# Patient Record
Sex: MALE | Race: OTHER | HISPANIC OR LATINO
[De-identification: names, ages, dates, MRNs, and addresses within clinical notes are randomized per-mention and may not be internally consistent; named-entity substitution may affect disease eponyms.]

---

## 2021-09-24 PROBLEM — Z00.00 ENCOUNTER FOR PREVENTIVE HEALTH EXAMINATION: Status: ACTIVE | Noted: 2021-09-24

## 2021-10-29 ENCOUNTER — NON-APPOINTMENT (OUTPATIENT)
Age: 51
End: 2021-10-29

## 2021-10-29 ENCOUNTER — APPOINTMENT (OUTPATIENT)
Dept: GASTROENTEROLOGY | Facility: CLINIC | Age: 51
End: 2021-10-29
Payer: COMMERCIAL

## 2021-10-29 VITALS
OXYGEN SATURATION: 98 % | HEIGHT: 66 IN | BODY MASS INDEX: 25.55 KG/M2 | DIASTOLIC BLOOD PRESSURE: 60 MMHG | HEART RATE: 45 BPM | WEIGHT: 159 LBS | TEMPERATURE: 97.3 F | SYSTOLIC BLOOD PRESSURE: 108 MMHG

## 2021-10-29 DIAGNOSIS — Z12.11 ENCOUNTER FOR SCREENING FOR MALIGNANT NEOPLASM OF COLON: ICD-10-CM

## 2021-10-29 PROCEDURE — 99072 ADDL SUPL MATRL&STAF TM PHE: CPT

## 2021-10-29 PROCEDURE — S0285 CNSLT BEFORE SCREEN COLONOSC: CPT

## 2021-10-29 NOTE — ASSESSMENT
[FreeTextEntry1] : Will plan on a colonoscopy for screening.  Explained risks/benefits/alternatives including not limited to bleeding, infection, perforation, missed lesions, anesthesia complications.  Patient understands and agrees, all questions answered.  Will use a split dose miralax/gatorade prep with clears the day prior.\par \par Thank you for referring Mr. MONTANA.  Please do not hesitate to call to further discuss his/her care.\par \par Note faxed to requesting MD.\par \par

## 2021-10-29 NOTE — HISTORY OF PRESENT ILLNESS
[FreeTextEntry1] : Mr. Clarke is a pleasant 51M no significant PMHx who is referred by Dr. Memo Mckinney from [facility] for colon cancer screening.\par \par No complaints, feels well. Normal brown BM daily, no diarrhea, constipation, abd pain, heartburn, regurgitation.\par \par Otherwise feels well.\par \par Does not smoke or drink.\par \par No FHx of any GI malignancies.\par \par Is a marathon runner.

## 2022-01-23 ENCOUNTER — RESULT REVIEW (OUTPATIENT)
Age: 52
End: 2022-01-23

## 2022-01-26 ENCOUNTER — APPOINTMENT (OUTPATIENT)
Dept: GASTROENTEROLOGY | Facility: HOSPITAL | Age: 52
End: 2022-01-26

## 2023-05-22 DIAGNOSIS — Z86.19 PERSONAL HISTORY OF OTHER INFECTIOUS AND PARASITIC DISEASES: ICD-10-CM

## 2023-05-22 DIAGNOSIS — H52.4 PRESBYOPIA: ICD-10-CM

## 2023-05-22 DIAGNOSIS — Z87.09 PERSONAL HISTORY OF OTHER DISEASES OF THE RESPIRATORY SYSTEM: ICD-10-CM

## 2023-05-22 DIAGNOSIS — Z87.19 PERSONAL HISTORY OF OTHER DISEASES OF THE DIGESTIVE SYSTEM: ICD-10-CM

## 2023-05-22 DIAGNOSIS — Z86.39 PERSONAL HISTORY OF OTHER ENDOCRINE, NUTRITIONAL AND METABOLIC DISEASE: ICD-10-CM

## 2023-05-22 DIAGNOSIS — Z22.7 LATENT TUBERCULOSIS: ICD-10-CM

## 2023-05-31 ENCOUNTER — NON-APPOINTMENT (OUTPATIENT)
Age: 53
End: 2023-05-31

## 2023-05-31 ENCOUNTER — APPOINTMENT (OUTPATIENT)
Dept: CARDIOLOGY | Facility: CLINIC | Age: 53
End: 2023-05-31
Payer: COMMERCIAL

## 2023-05-31 VITALS
DIASTOLIC BLOOD PRESSURE: 70 MMHG | HEIGHT: 66 IN | SYSTOLIC BLOOD PRESSURE: 119 MMHG | OXYGEN SATURATION: 97 % | BODY MASS INDEX: 25.88 KG/M2 | WEIGHT: 161 LBS | HEART RATE: 67 BPM

## 2023-05-31 DIAGNOSIS — Z78.9 OTHER SPECIFIED HEALTH STATUS: ICD-10-CM

## 2023-05-31 DIAGNOSIS — R07.9 CHEST PAIN, UNSPECIFIED: ICD-10-CM

## 2023-05-31 DIAGNOSIS — Z87.891 PERSONAL HISTORY OF NICOTINE DEPENDENCE: ICD-10-CM

## 2023-05-31 DIAGNOSIS — R94.31 ABNORMAL ELECTROCARDIOGRAM [ECG] [EKG]: ICD-10-CM

## 2023-05-31 DIAGNOSIS — Z84.2 FAMILY HISTORY OF OTHER DISEASES OF THE GENITOURINARY SYSTEM: ICD-10-CM

## 2023-05-31 PROCEDURE — 93000 ELECTROCARDIOGRAM COMPLETE: CPT

## 2023-05-31 PROCEDURE — 99204 OFFICE O/P NEW MOD 45 MIN: CPT

## 2023-06-04 PROBLEM — Z78.9 SOCIAL ALCOHOL USE: Status: ACTIVE | Noted: 2023-06-04

## 2023-06-04 PROBLEM — Z87.891 FORMER SMOKER: Status: ACTIVE | Noted: 2023-06-04

## 2023-06-04 PROBLEM — Z84.2 FAMILY HISTORY OF PROSTATE PROBLEMS: Status: ACTIVE | Noted: 2023-06-04

## 2023-06-04 RX ORDER — CHOLECALCIFEROL (VITAMIN D3) 25 MCG
TABLET ORAL
Refills: 0 | Status: ACTIVE | COMMUNITY

## 2023-06-04 NOTE — REVIEW OF SYSTEMS
[Feeling Fatigued] : feeling fatigued [Chest Discomfort] : chest discomfort [Negative] : Psychiatric [FreeTextEntry5] : See history of present illness

## 2023-06-04 NOTE — PHYSICAL EXAM
[Normal Conjunctiva] : normal conjunctiva [Normal S1, S2] : normal S1, S2 [Clear Lung Fields] : clear lung fields [Soft] : abdomen soft [Non Tender] : non-tender [Normal Bowel Sounds] : normal bowel sounds [Normal Gait] : normal gait [No Rash] : no rash [No Focal Deficits] : no focal deficits [de-identified] : Appears fit in no distress lying flat [de-identified] : Normocephalic [de-identified] : No murmur.  No gallop.  No diastolic sounds.  The point of maximal impulse normal and not displaced [de-identified] : No neck vein distention.  No carotid bruit.  Thyroid not palpable [de-identified] : Full range of motion [de-identified] : Pleasant. [de-identified] : No peripheral edema.  Dorsalis pedis pulses +2 bilaterally.  Feet warm and well-perfused.  No ulcerations.

## 2023-06-04 NOTE — HISTORY OF PRESENT ILLNESS
[FreeTextEntry1] : 52-year-old man\par Cardiology consultation requested by Dr. Bartholomew  because of chest pain and an abnormal electrocardiogram\par \portillo Ware has no known heart disease.  There is no history of a myocardial infarction angina or congestive heart failure.\par In 5/23 he complained of right shoulder pain with radiation towards the chest.  He was evaluated by urgent care/Dr. Bartholomew.  He was found to have an abnormal electrocardiogram and a cardiology consultation was requested.\par \par \portillo Ware  denies exertional chest pain, palpitations shortness of breath or syncope.  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  There is no family history of myocardial infarction or sudden death.  He smoked briefly only as a teenager but not since that time.  There is a prior history of Lyme disease requiring hospitalization in 2003.  No cardiac abnormality was described.\par \par \portillo Ware  presents today for cardiovascular examination he is accompanied by his wife\par

## 2023-06-04 NOTE — DISCUSSION/SUMMARY
[FreeTextEntry1] : Chest pain\par The working diagnosis is musculoskeletal/noncardiac chest pain. The history is compelling for this diagnosis.  The patient has no significant risk factors for the development of ischemic heart disease and the resting 5/23 electrocardiogram shows no evidence of myocardial ischemia or infarction.  Noninvasive cardiac studies would be helpful for further evaluation.\par \par I have recommended the following\par a.  Reassurance\par b.  Echocardiogram\par c.  Stress treadmill ECG study\par \par \par \par Abnormal electrocardiogram\par The working diagnosis is borderline first-degree AV block and minor intraventricular conduction delay due to sclerosis of the conduction system  without underlying structural heart disease.  There is a prior history of Lyme disease.  However there is no report of cardiac involvement i.e. Lyme carditis.  Permanent pacemaker implantation is not indicated in the absence of symptomatic bradycardia or high degree AV block.\par \par \par I have recommended the following\par a.  Reassurance\par b.  Echocardiogram\par \par \par \par The diagnosis, prognosis, risks, options and alternatives were explained at length to the patient and family all questions were answered issues discussed included electrocardiographic abnormalities chest pain atherosclerotic heart disease noninvasive cardiac testing diet and exercise.

## 2023-06-16 ENCOUNTER — RESULT REVIEW (OUTPATIENT)
Age: 53
End: 2023-06-16

## 2023-06-16 ENCOUNTER — APPOINTMENT (OUTPATIENT)
Dept: HEMATOLOGY ONCOLOGY | Facility: CLINIC | Age: 53
End: 2023-06-16
Payer: COMMERCIAL

## 2023-06-16 VITALS
HEIGHT: 66 IN | SYSTOLIC BLOOD PRESSURE: 104 MMHG | WEIGHT: 162.5 LBS | BODY MASS INDEX: 26.12 KG/M2 | TEMPERATURE: 97.1 F | DIASTOLIC BLOOD PRESSURE: 68 MMHG | OXYGEN SATURATION: 98 % | HEART RATE: 45 BPM | RESPIRATION RATE: 17 BRPM

## 2023-06-16 PROCEDURE — 36415 COLL VENOUS BLD VENIPUNCTURE: CPT

## 2023-06-16 PROCEDURE — 99205 OFFICE O/P NEW HI 60 MIN: CPT | Mod: 25

## 2023-06-16 NOTE — RESULTS/DATA
[FreeTextEntry1] : Prior labs and Labs ordered, drawn in the office, reviewed, analyzed and discussed\par

## 2023-06-16 NOTE — HISTORY OF PRESENT ILLNESS
[de-identified] : Mr. Chaz Saucedo is 52 year old male with thrombocytopenia here for consultation.\par \par Patient with no PMHx who had right shoulder pain radiating to chest in May 2023, went into urgent care and found to have abnormal EKG, then referred to cardiologist Dr. Lorenzo Duff who is scheduled Echo.  \par \par 6/16/2023 Plt 112 WBC 5.6 H/H 14/42.1 MCV 91 \par \par Denies any family history of hematological and/or oncological disorder\par \par Colonoscopy 2021. \par \par Other than right shoulder pain, he's doing well\par

## 2023-06-16 NOTE — ASSESSMENT
[FreeTextEntry1] : Thrombocytopenia\par Took terbenafine x 3 months. Completed Feb 2023\par 6/16/2023 Plt 112 WBC 5.6 H/H 14/42.1 MCV 91 \par Urgent care - right shoulder and chest pain, saw cardiology \par Reports having URTI ~ 6-7 months ago\par Denies any new meds\par \par Prior labs and Labs ordered, drawn in the office, reviewed, analyzed and discussed\par Discussed at length about the differential diagnosis such as viral infection (HIV, Hepatitis B, Hepatitis C), nutritional (B12, folate deficiency), thyroid disorders, liver disease, alcohol, hypersplenism, drug induced, connective tissue disorders, ITP, Aplastic anemia, MDS\par Send blood work including CBC, B12, Folate, TSH etc\par [facility] abdomen\par \par Patient and wife had multiple questions which were answered to satisfaction\par \par Follow up in a few weeks\par No labs

## 2023-06-23 ENCOUNTER — RESULT REVIEW (OUTPATIENT)
Age: 53
End: 2023-06-23

## 2023-06-27 ENCOUNTER — APPOINTMENT (OUTPATIENT)
Dept: GASTROENTEROLOGY | Facility: CLINIC | Age: 53
End: 2023-06-27
Payer: COMMERCIAL

## 2023-06-27 ENCOUNTER — RESULT REVIEW (OUTPATIENT)
Age: 53
End: 2023-06-27

## 2023-06-27 VITALS
HEART RATE: 44 BPM | WEIGHT: 160 LBS | DIASTOLIC BLOOD PRESSURE: 70 MMHG | BODY MASS INDEX: 25.71 KG/M2 | OXYGEN SATURATION: 99 % | HEIGHT: 66 IN | RESPIRATION RATE: 18 BRPM | SYSTOLIC BLOOD PRESSURE: 106 MMHG

## 2023-06-27 DIAGNOSIS — K64.8 OTHER HEMORRHOIDS: ICD-10-CM

## 2023-06-27 PROCEDURE — 99213 OFFICE O/P EST LOW 20 MIN: CPT

## 2023-06-27 NOTE — ASSESSMENT
[FreeTextEntry1] : Bleeding very likely due to internal hemorrhoids, now resolved.\par \par Stool is back to normal, no constipation.\par \par Advised on staying well hydrated. If he develops constipation or hard stool, would start a fiber supplement.\par \par F/U PRN.

## 2023-06-27 NOTE — HISTORY OF PRESENT ILLNESS
[FreeTextEntry1] : Mr. Clarke is a pleasant 52M h/o thrombocytopenia who returns for f/u.\par \par Underwent a colonoscopy with myself 1/22 showing only internal hemorrhoids.\par \par Two months ago, he passed hard brown stool followed by [facility], he saw small amounts of BRB for three days, none since.  Stool is not normally hard, normally no straining.\par \par Since that time, normal brown BM daily, no blood, no black stool.\par \par Otherwise feels in his normal state of health.

## 2023-06-28 ENCOUNTER — APPOINTMENT (OUTPATIENT)
Dept: CARDIOLOGY | Facility: CLINIC | Age: 53
End: 2023-06-28
Payer: COMMERCIAL

## 2023-06-28 PROCEDURE — 93015 CV STRESS TEST SUPVJ I&R: CPT

## 2023-06-29 ENCOUNTER — APPOINTMENT (OUTPATIENT)
Dept: HEMATOLOGY ONCOLOGY | Facility: CLINIC | Age: 53
End: 2023-06-29
Payer: COMMERCIAL

## 2023-06-29 VITALS
HEIGHT: 66 IN | HEART RATE: 53 BPM | OXYGEN SATURATION: 97 % | BODY MASS INDEX: 26.68 KG/M2 | DIASTOLIC BLOOD PRESSURE: 67 MMHG | RESPIRATION RATE: 16 BRPM | SYSTOLIC BLOOD PRESSURE: 96 MMHG | TEMPERATURE: 98 F | WEIGHT: 166 LBS

## 2023-06-29 PROCEDURE — 99214 OFFICE O/P EST MOD 30 MIN: CPT | Mod: 25

## 2023-06-29 NOTE — HISTORY OF PRESENT ILLNESS
[de-identified] : Mr. Chaz Saucedo is 52 year old male with thrombocytopenia here for consultation.\par \par Patient with no PMHx who had right shoulder pain radiating to chest in May 2023, went into urgent care and found to have abnormal EKG, then referred to cardiologist Dr. Lorenzo Duff who is scheduled Echo.  \par \par 6/16/2023 Plt 112 WBC 5.6 H/H 14/42.1 MCV 91 \par \par Denies any family history of hematological and/or oncological disorder\par \par Colonoscopy 2021. \par \par Other than right shoulder pain, he's doing well\par  [de-identified] : Patient is seen today for follow up\par \par No new symptoms\par

## 2023-06-29 NOTE — RESULTS/DATA
[FreeTextEntry1] : Labs  reviewed, analyzed and discussed\par \par USG abdomen (6/23/23)\par Negative sonogram abdomen

## 2023-06-29 NOTE — ASSESSMENT
[FreeTextEntry1] : Thrombocytopenia\par Took terbenafine x 3 months. Completed Feb 2023\par 6/16/2023 Plt 112 WBC 5.6 H/H 14/42.1 MCV 91 \par Urgent care - right shoulder and chest pain, saw cardiology \par Reports having URTI ~ 6-7 months ago\par Denies any new meds\par \par Seen today for follow-up\par Feels good overall and has no signs of bleeding\par Labs and ultrasound reviewed analyzed and discussed\par Largely unremarkable\par At this point I think he has ITP\par No intervention necessary\par Monitor for signs and symptoms of bleeding and bruising-images showed from Internet\par \par Patient had multiple questions which were answered to satisfaction\par \par Follow up in 6m\par cbc

## 2023-07-02 ENCOUNTER — NON-APPOINTMENT (OUTPATIENT)
Age: 53
End: 2023-07-02

## 2023-07-18 ENCOUNTER — APPOINTMENT (OUTPATIENT)
Dept: HEART AND VASCULAR | Facility: CLINIC | Age: 53
End: 2023-07-18

## 2023-07-26 ENCOUNTER — APPOINTMENT (OUTPATIENT)
Dept: GASTROENTEROLOGY | Facility: CLINIC | Age: 53
End: 2023-07-26

## 2023-11-09 ENCOUNTER — APPOINTMENT (OUTPATIENT)
Dept: RHEUMATOLOGY | Facility: CLINIC | Age: 53
End: 2023-11-09
Payer: COMMERCIAL

## 2023-11-09 VITALS
SYSTOLIC BLOOD PRESSURE: 106 MMHG | WEIGHT: 162 LBS | DIASTOLIC BLOOD PRESSURE: 60 MMHG | BODY MASS INDEX: 26.03 KG/M2 | HEIGHT: 66 IN | OXYGEN SATURATION: 97 % | HEART RATE: 59 BPM

## 2023-11-09 DIAGNOSIS — D69.6 THROMBOCYTOPENIA, UNSPECIFIED: ICD-10-CM

## 2023-11-09 DIAGNOSIS — R20.8 OTHER DISTURBANCES OF SKIN SENSATION: ICD-10-CM

## 2023-11-09 DIAGNOSIS — R76.8 OTHER SPECIFIED ABNORMAL IMMUNOLOGICAL FINDINGS IN SERUM: ICD-10-CM

## 2023-11-09 PROCEDURE — 99204 OFFICE O/P NEW MOD 45 MIN: CPT

## 2024-01-11 ENCOUNTER — RESULT REVIEW (OUTPATIENT)
Age: 54
End: 2024-01-11

## 2024-01-11 ENCOUNTER — APPOINTMENT (OUTPATIENT)
Dept: HEMATOLOGY ONCOLOGY | Facility: CLINIC | Age: 54
End: 2024-01-11
Payer: COMMERCIAL

## 2024-01-11 VITALS
DIASTOLIC BLOOD PRESSURE: 70 MMHG | HEART RATE: 55 BPM | BODY MASS INDEX: 25.79 KG/M2 | HEIGHT: 66 IN | TEMPERATURE: 98.1 F | RESPIRATION RATE: 17 BRPM | OXYGEN SATURATION: 97 % | WEIGHT: 160.5 LBS | SYSTOLIC BLOOD PRESSURE: 113 MMHG

## 2024-01-11 DIAGNOSIS — D69.3 IMMUNE THROMBOCYTOPENIC PURPURA: ICD-10-CM

## 2024-01-11 PROCEDURE — 36415 COLL VENOUS BLD VENIPUNCTURE: CPT

## 2024-01-11 PROCEDURE — 99213 OFFICE O/P EST LOW 20 MIN: CPT | Mod: 25

## 2024-01-11 NOTE — HISTORY OF PRESENT ILLNESS
[de-identified] : Mr. Chaz Saucedo is 52 year old male with thrombocytopenia here for consultation.\par  \par  Patient with no PMHx who had right shoulder pain radiating to chest in May 2023, went into urgent care and found to have abnormal EKG, then referred to cardiologist Dr. Lorenzo Duff who is scheduled Echo.  \par  \par  6/16/2023 Plt 112 WBC 5.6 H/H 14/42.1 MCV 91 \par  \par  Denies any family history of hematological and/or oncological disorder\par  \par  Colonoscopy 2021. \par  \par  Other than right shoulder pain, he's doing well\par   [de-identified] : Patient is seen today for follow up\par  \par  No new symptoms\par

## 2024-01-11 NOTE — ASSESSMENT
[FreeTextEntry1] : Thrombocytopenia Likely chronic ITP  Seen today for follow-up Feels good overall and has no signs of bleeding Labs ordered, drawn in the office, reviewed, analyzed and discussed Largely stable No intervention necessary Monitor for signs and symptoms of bleeding and bruising  Patient had multiple questions which were answered to satisfaction  Follow up in 12 m or sooner if needed cbc

## 2024-01-11 NOTE — RESULTS/DATA
[FreeTextEntry1] : Labs ordered, drawn in the office, reviewed, analyzed and discussed  [facility] abdomen (6/23/23) Negative sonogram abdomen

## 2024-02-02 ENCOUNTER — APPOINTMENT (OUTPATIENT)
Dept: ENDOCRINOLOGY | Facility: CLINIC | Age: 54
End: 2024-02-02
Payer: COMMERCIAL

## 2024-02-02 VITALS
HEART RATE: 60 BPM | SYSTOLIC BLOOD PRESSURE: 119 MMHG | OXYGEN SATURATION: 98 % | DIASTOLIC BLOOD PRESSURE: 68 MMHG | WEIGHT: 160 LBS | BODY MASS INDEX: 25.71 KG/M2 | HEIGHT: 66 IN

## 2024-02-02 DIAGNOSIS — E03.8 OTHER SPECIFIED HYPOTHYROIDISM: ICD-10-CM

## 2024-02-02 PROCEDURE — G2211 COMPLEX E/M VISIT ADD ON: CPT

## 2024-02-02 PROCEDURE — 99204 OFFICE O/P NEW MOD 45 MIN: CPT

## 2024-02-02 NOTE — HISTORY OF PRESENT ILLNESS
[FreeTextEntry1] : Prior h/o thyroid disorders- Hypothyroidism diagnosed around 2017 Recent TSH- 4.72 uIU/mL in October 2023 with a free T4 of 1.0 ng/dL Recent thyroid ultrasound- no Prior biopsies- Denies Prior h/o thyroid surgery- Denies Prior h/o MAYERS treatment- Denies Prior h/o thyroid medications- Thyroid hormone [id number]; unsure of name or dose.  Recent illness- Denies Biotin use- Denies Amiodarone use- Denies Prior h/o radiation exposure to head or neck- Denies Prior h/o exposure to nuclear accident- Denies Family h/o thyroid cancer- Denies Family h/o thyroid disorder- Denies  2/2/2024- INITIAL VISIT Patient reports that his wife has grown concerned with patient's persistent afternoon fatigue, scheduling this appointment for him to have his thyroid disorder evaluated.  He denies temperature intolerance, lightheadedness, syncope, decreased appetite, unintentional weight changes, shortness of breath, nausea, nor edema. He elaborated how he did not experience any notable improvement in original fatigue symptoms after starting thyroid hormone replacement 7 years ago. Additionally, he feels the same since stopping it.  His most recent TSH is 4.72 uIU/mL which is within normal range.  At worst patient may have subclinical hypothyroidism without any compelling symptoms to indicate treatment.  If patient ever develops TSH greater than 10 uIU/mL or overt hypothyroidism with below normal T4, then we will initiate treatment.  Will obtain thyroid function test today and follow-up results with patient.  If normal he can continue routine evaluations per his primary care doctor. He can always return to clinic as needed. All questions and concerns were fully addressed to patient's satisfaction. Patient is in agreement with stated plan.

## 2024-02-02 NOTE — PHYSICAL EXAM
[Alert] : alert [Well Nourished] : well nourished [Healthy Appearance] : healthy appearance [No Acute Distress] : no acute distress [Well Developed] : well developed [Normal Voice/Communication] : normal voice communication [Normal Sclera/Conjunctiva] : normal sclera/conjunctiva [EOMI] : extra ocular movement intact [Normal Hearing] : hearing was normal [No Neck Mass] : no neck mass was observed [No LAD] : no lymphadenopathy [Thyroid Not Enlarged] : the thyroid was not enlarged [No Thyroid Nodules] : no palpable thyroid nodules [No Respiratory Distress] : no respiratory distress [Normal Rate and Effort] : normal respiratory rate and effort [Normal Rate] : heart rate was normal [Regular Rhythm] : with a regular rhythm [No Edema] : no peripheral edema [No Varicosities] : there were no varicosital changes [No Stigmata of Cushings Syndrome] : no stigmata of Cushings Syndrome [Normal Gait] : normal gait [No Joint Swelling] : no joint swelling seen [Normal Strength/Tone] : muscle strength and tone were normal [No Motor Deficits] : the motor exam was normal [Normal Reflexes] : deep tendon reflexes were 2+ and symmetric [No Tremors] : no tremors [Oriented x3] : oriented to person, place, and time [Normal Affect] : the affect was normal [Recent Memory Normal] : recent memory was not impaired [Normal Insight/Judgement] : insight and judgment were intact [Normal Mood] : the mood was normal [Remote Memory Normal] : remote memory was not impaired

## 2024-02-05 LAB
ALBUMIN SERPL ELPH-MCNC: 4.6 G/DL
ALP BLD-CCNC: 107 U/L
ALT SERPL-CCNC: 37 U/L
ANION GAP SERPL CALC-SCNC: 12 MMOL/L
AST SERPL-CCNC: 32 U/L
BASOPHILS # BLD AUTO: 0.06 K/UL
BASOPHILS NFR BLD AUTO: 1.1 %
BILIRUB SERPL-MCNC: 0.4 MG/DL
BUN SERPL-MCNC: 17 MG/DL
CALCIUM SERPL-MCNC: 9.5 MG/DL
CHLORIDE SERPL-SCNC: 102 MMOL/L
CO2 SERPL-SCNC: 24 MMOL/L
CREAT SERPL-MCNC: 0.95 MG/DL
EGFR: 96 ML/MIN/1.73M2
EOSINOPHIL # BLD AUTO: 0.42 K/UL
EOSINOPHIL NFR BLD AUTO: 7.4 %
GLUCOSE SERPL-MCNC: 96 MG/DL
HCT VFR BLD CALC: 46.3 %
HGB BLD-MCNC: 15.1 G/DL
IMM GRANULOCYTES NFR BLD AUTO: 0.4 %
LYMPHOCYTES # BLD AUTO: 1.84 K/UL
LYMPHOCYTES NFR BLD AUTO: 32.3 %
MAN DIFF?: NORMAL
MCHC RBC-ENTMCNC: 30.2 PG
MCHC RBC-ENTMCNC: 32.6 GM/DL
MCV RBC AUTO: 92.6 FL
MONOCYTES # BLD AUTO: 0.41 K/UL
MONOCYTES NFR BLD AUTO: 7.2 %
NEUTROPHILS # BLD AUTO: 2.95 K/UL
NEUTROPHILS NFR BLD AUTO: 51.6 %
PLATELET # BLD AUTO: 125 K/UL
POTASSIUM SERPL-SCNC: 4.3 MMOL/L
PROT SERPL-MCNC: 7.4 G/DL
RBC # BLD: 5 M/UL
RBC # FLD: 12.3 %
SODIUM SERPL-SCNC: 139 MMOL/L
T3 SERPL-MCNC: 115 NG/DL
T4 FREE SERPL-MCNC: 1.2 NG/DL
THYROGLOB AB SERPL-ACNC: <20 IU/ML
THYROPEROXIDASE AB SERPL IA-ACNC: 22.3 IU/ML
TSH SERPL-ACNC: 5.57 UIU/ML
WBC # FLD AUTO: 5.7 K/UL

## 2024-03-08 ENCOUNTER — APPOINTMENT (OUTPATIENT)
Dept: ORTHOPEDIC SURGERY | Facility: CLINIC | Age: 54
End: 2024-03-08
Payer: COMMERCIAL

## 2024-03-08 ENCOUNTER — RESULT REVIEW (OUTPATIENT)
Age: 54
End: 2024-03-08

## 2024-03-08 VITALS — HEIGHT: 66 IN | BODY MASS INDEX: 25.55 KG/M2 | WEIGHT: 159 LBS

## 2024-03-08 PROCEDURE — 99203 OFFICE O/P NEW LOW 30 MIN: CPT

## 2024-03-08 NOTE — PHYSICAL EXAM
[de-identified] : left knee rom 0-140 stabe to a/p/v/v stress nvid no ttp standing he notes pain medially over distal femur no swelling or effusion [de-identified] : knees look good

## 2024-03-08 NOTE — HISTORY OF PRESENT ILLNESS
[de-identified] : left knee pain for a week after twisting it sking much less in right no prior no pain at rest sometimes walking medial

## 2024-05-23 DIAGNOSIS — S83.419A SPRAIN OF MEDIAL COLLATERAL LIGAMENT OF UNSPECIFIED KNEE, INITIAL ENCOUNTER: ICD-10-CM

## 2024-07-24 ENCOUNTER — APPOINTMENT (OUTPATIENT)
Dept: RHEUMATOLOGY | Facility: CLINIC | Age: 54
End: 2024-07-24
Payer: COMMERCIAL

## 2024-07-24 VITALS
BODY MASS INDEX: 25.55 KG/M2 | HEART RATE: 45 BPM | DIASTOLIC BLOOD PRESSURE: 62 MMHG | SYSTOLIC BLOOD PRESSURE: 98 MMHG | OXYGEN SATURATION: 98 % | HEIGHT: 66 IN | WEIGHT: 159 LBS

## 2024-07-24 DIAGNOSIS — S83.419A SPRAIN OF MEDIAL COLLATERAL LIGAMENT OF UNSPECIFIED KNEE, INITIAL ENCOUNTER: ICD-10-CM

## 2024-07-24 DIAGNOSIS — R76.8 OTHER SPECIFIED ABNORMAL IMMUNOLOGICAL FINDINGS IN SERUM: ICD-10-CM

## 2024-07-24 PROCEDURE — G2211 COMPLEX E/M VISIT ADD ON: CPT

## 2024-07-24 PROCEDURE — 99214 OFFICE O/P EST MOD 30 MIN: CPT

## 2024-07-24 NOTE — PHYSICAL EXAM
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

## 2024-07-24 NOTE — HISTORY OF PRESENT ILLNESS
[___ Month(s) Ago] : [unfilled] month(s) ago [FreeTextEntry1] : no systemic symptoms no skin rashes no inflammatory eye disease doing well [employer] knee sprain now recovered, back to running

## 2024-07-25 LAB
C3 SERPL-MCNC: 98 MG/DL
C4 SERPL-MCNC: 18 MG/DL

## 2024-07-28 LAB — DSDNA AB SER-ACNC: <1 IU/ML

## 2024-08-05 ENCOUNTER — APPOINTMENT (OUTPATIENT)
Dept: ENDOCRINOLOGY | Facility: CLINIC | Age: 54
End: 2024-08-05

## 2024-08-05 PROBLEM — R53.82 CHRONIC FATIGUE: Status: ACTIVE | Noted: 2024-08-05

## 2024-08-05 PROCEDURE — G2211 COMPLEX E/M VISIT ADD ON: CPT

## 2024-08-05 PROCEDURE — 99213 OFFICE O/P EST LOW 20 MIN: CPT

## 2024-08-05 NOTE — HISTORY OF PRESENT ILLNESS
[FreeTextEntry1] : Prior h/o thyroid disorders- Subclinical hypothyroidism diagnosed around 2017 Recent TSH-5.57 uIU/mL with a free T4 of 1.2 ng/dL in February 2024. Recent thyroid ultrasound- no Prior biopsies- Denies Prior h/o thyroid surgery- Denies Prior h/o MAYERS treatment- Denies Prior h/o thyroid medications- Thyroid hormone [id number]; unsure of name or dose.  Recent illness- Denies Biotin use- Denies Amiodarone use- Denies Prior h/o radiation exposure to head or neck- Denies Prior h/o exposure to nuclear accident- Denies Family h/o thyroid cancer- Denies Family h/o thyroid disorder- Denies  2/2/2024- INITIAL VISIT Patient reports that his wife has grown concerned with patient's persistent afternoon fatigue, scheduling this appointment for him to have his thyroid disorder evaluated.  He denies temperature intolerance, lightheadedness, syncope, decreased appetite, unintentional weight changes, shortness of breath, nausea, nor edema. He elaborated how he did not experience any notable improvement in original fatigue symptoms after starting thyroid hormone replacement 7 years ago. Additionally, he feels the same since stopping it.  His most recent TSH is 4.72 uIU/mL which is within normal range.  At worst patient may have subclinical hypothyroidism without any compelling symptoms to indicate treatment.  If patient ever develops TSH greater than 10 uIU/mL or overt hypothyroidism with below normal T4, then we will initiate treatment.  Will obtain thyroid function test today and follow-up results with patient.  If normal he can continue routine evaluations per his primary care doctor. He can always return to clinic as needed. All questions and concerns were fully addressed to patient's satisfaction. Patient is in agreement with stated plan.  08/05/2024- FOLLOW UP Patient doing well overall without any new or worsening symptoms.  His last set of thyroid studies demonstrate subclinical hypothyroidism, though his symptoms did not improve with thyroid hormone replacement when given between 2017 and 2020.  He still has profound fatigue, worse in the afternoon.  He does note issues with snoring and reports that his father was a loud snorer as well.  I recommend the patient undergo sleep evaluation for obstructive sleep apnea; pulmonology clinic information provided. Patient will return to my clinic in 6 months for routine follow-up with thyroid studies obtained prior to that visit.  If there are no significant changes in his thyroid studies, then I would recommend the patient follow-up with his primary doctor for routine monitoring of his thyroid function. All questions and concerns were fully addressed to patient's satisfaction. Patient is in agreement with stated plan.

## 2024-08-05 NOTE — REVIEW OF SYSTEMS
[Fatigue] : fatigue [Decreased Appetite] : appetite not decreased [Recent Weight Loss (___ Lbs)] : no recent weight loss [Easy Bleeding] : no ~M tendency for easy bleeding [Negative] : Endocrine

## 2024-08-05 NOTE — REASON FOR VISIT
[Initial Evaluation] : an initial evaluation [Hypothyroidism] : hypothyroidism [Source: ______] : History obtained from [facility] [FreeTextEntry2] : Self-referral

## 2024-08-05 NOTE — PHYSICAL EXAM
[Alert] : alert [Healthy Appearance] : healthy appearance [No Acute Distress] : no acute distress [Well Developed] : well developed [Normal Voice/Communication] : normal voice communication [Normal Sclera/Conjunctiva] : normal sclera/conjunctiva [EOMI] : extra ocular movement intact [Normal Hearing] : hearing was normal [No Respiratory Distress] : no respiratory distress [Normal Rate and Effort] : normal respiratory rate and effort [Normal Rate] : heart rate was normal [No Edema] : no peripheral edema [No Varicosities] : there were no varicosital changes [No Stigmata of Cushings Syndrome] : no stigmata of Cushings Syndrome [Normal Gait] : normal gait [No Joint Swelling] : no joint swelling seen [Normal Strength/Tone] : muscle strength and tone were normal [No Motor Deficits] : the motor exam was normal [No Tremors] : no tremors [Oriented x3] : oriented to person, place, and time [Normal Affect] : the affect was normal [Recent Memory Normal] : recent memory was not impaired [Normal Insight/Judgement] : insight and judgment were intact [Normal Mood] : the mood was normal [Remote Memory Normal] : remote memory was not impaired [de-identified] : Tired looking.

## 2024-08-23 ENCOUNTER — APPOINTMENT (OUTPATIENT)
Dept: PULMONOLOGY | Facility: CLINIC | Age: 54
End: 2024-08-23
Payer: COMMERCIAL

## 2024-08-23 VITALS
WEIGHT: 159 LBS | HEIGHT: 66 IN | BODY MASS INDEX: 25.55 KG/M2 | DIASTOLIC BLOOD PRESSURE: 60 MMHG | OXYGEN SATURATION: 97 % | SYSTOLIC BLOOD PRESSURE: 110 MMHG | HEART RATE: 50 BPM

## 2024-08-23 DIAGNOSIS — R06.83 SNORING: ICD-10-CM

## 2024-08-23 PROCEDURE — 99203 OFFICE O/P NEW LOW 30 MIN: CPT

## 2024-08-23 NOTE — HISTORY OF PRESENT ILLNESS
[TextBox_4] : 53-year-old male for evaluation of possible sleep apnea.  Patient's wife notes loud snoring.  It is unclear if he has witnessed apneas.  His bedtime is 11 PM and he wakes up at 630.  He may wake up twice per night for short period of time.  He feels excessively fatigued and sleepy during the day especially around 2 or 3:00 in the afternoon.  His Pennington score is 10.  He is not overweight at 5 feet 6 weight 160.  He works in construction.  Past medical history significant for hypothyroidism and ITP.  He is not on any medication.  He denies snoring or alcohol use.  He admits to chronic fatigue.

## 2024-08-23 NOTE — ASSESSMENT
[FreeTextEntry1] : Patient with a history of loud snoring and excess daytime sleepiness.  The patient is not obese.  I feel we should proceed with an in lab sleep study to attempt to make a correct diagnosis if this patient truly has sleep apnea.

## 2025-01-09 ENCOUNTER — APPOINTMENT (OUTPATIENT)
Dept: HEMATOLOGY ONCOLOGY | Facility: CLINIC | Age: 55
End: 2025-01-09
Payer: COMMERCIAL

## 2025-01-09 ENCOUNTER — RESULT REVIEW (OUTPATIENT)
Age: 55
End: 2025-01-09

## 2025-01-09 VITALS
RESPIRATION RATE: 17 BRPM | HEART RATE: 53 BPM | SYSTOLIC BLOOD PRESSURE: 91 MMHG | TEMPERATURE: 97.5 F | DIASTOLIC BLOOD PRESSURE: 60 MMHG | BODY MASS INDEX: 24.67 KG/M2 | WEIGHT: 153.5 LBS | OXYGEN SATURATION: 97 % | HEIGHT: 66 IN

## 2025-01-09 DIAGNOSIS — D69.3 IMMUNE THROMBOCYTOPENIC PURPURA: ICD-10-CM

## 2025-01-09 PROCEDURE — 99213 OFFICE O/P EST LOW 20 MIN: CPT

## 2025-01-09 PROCEDURE — 36415 COLL VENOUS BLD VENIPUNCTURE: CPT

## 2025-01-09 PROCEDURE — G2211 COMPLEX E/M VISIT ADD ON: CPT

## 2025-02-04 ENCOUNTER — LABORATORY RESULT (OUTPATIENT)
Age: 55
End: 2025-02-04

## 2025-02-10 ENCOUNTER — APPOINTMENT (OUTPATIENT)
Dept: ENDOCRINOLOGY | Facility: CLINIC | Age: 55
End: 2025-02-10
Payer: COMMERCIAL

## 2025-02-10 VITALS
WEIGHT: 153 LBS | HEIGHT: 66 IN | OXYGEN SATURATION: 97 % | SYSTOLIC BLOOD PRESSURE: 96 MMHG | HEART RATE: 63 BPM | BODY MASS INDEX: 24.59 KG/M2 | DIASTOLIC BLOOD PRESSURE: 63 MMHG

## 2025-02-10 DIAGNOSIS — E03.8 OTHER SPECIFIED HYPOTHYROIDISM: ICD-10-CM

## 2025-02-10 DIAGNOSIS — R53.82 CHRONIC FATIGUE, UNSPECIFIED: ICD-10-CM

## 2025-02-10 PROCEDURE — 99213 OFFICE O/P EST LOW 20 MIN: CPT

## 2025-02-18 LAB
LH SERPL-ACNC: 13.2 IU/L
PSA FREE FLD-MCNC: 50 %
PSA FREE SERPL-MCNC: 0.41 NG/ML
PSA SERPL-MCNC: 0.83 NG/ML
TESTOST FREE SERPL-MCNC: 7.5 PG/ML
TESTOST SERPL-MCNC: 524 NG/DL

## 2025-02-21 LAB — SHBG-ESOTERIX: 45.1 NMOL/L

## 2025-02-22 LAB
% FREE TESTOSTERONE (DIALYSIS): 2.2 %
FREE TESTOSTERONE, SERUM: 97 PG/ML
TESTOSTERONE: 439 NG/DL

## 2025-02-26 ENCOUNTER — APPOINTMENT (OUTPATIENT)
Dept: PULMONOLOGY | Facility: CLINIC | Age: 55
End: 2025-02-26
Payer: COMMERCIAL

## 2025-02-26 VITALS
DIASTOLIC BLOOD PRESSURE: 70 MMHG | HEIGHT: 66 IN | OXYGEN SATURATION: 98 % | WEIGHT: 150 LBS | HEART RATE: 68 BPM | SYSTOLIC BLOOD PRESSURE: 100 MMHG | BODY MASS INDEX: 24.11 KG/M2

## 2025-02-26 DIAGNOSIS — G47.33 OBSTRUCTIVE SLEEP APNEA (ADULT) (PEDIATRIC): ICD-10-CM

## 2025-02-26 PROCEDURE — 99213 OFFICE O/P EST LOW 20 MIN: CPT

## 2025-04-20 PROBLEM — I62.9: Status: ACTIVE | Noted: 2025-04-20

## 2025-04-21 ENCOUNTER — NON-APPOINTMENT (OUTPATIENT)
Age: 55
End: 2025-04-21

## 2025-04-21 ENCOUNTER — APPOINTMENT (OUTPATIENT)
Dept: NEUROSURGERY | Facility: CLINIC | Age: 55
End: 2025-04-21
Payer: COMMERCIAL

## 2025-04-21 VITALS
OXYGEN SATURATION: 98 % | SYSTOLIC BLOOD PRESSURE: 100 MMHG | HEART RATE: 63 BPM | BODY MASS INDEX: 24.91 KG/M2 | HEIGHT: 66 IN | WEIGHT: 155 LBS | DIASTOLIC BLOOD PRESSURE: 58 MMHG

## 2025-04-21 DIAGNOSIS — I62.9 NONTRAUMATIC INTRACRANIAL HEMORRHAGE, UNSPECIFIED: ICD-10-CM

## 2025-04-21 DIAGNOSIS — Q28.3 NONTRAUMATIC INTRACRANIAL HEMORRHAGE, UNSPECIFIED: ICD-10-CM

## 2025-04-21 DIAGNOSIS — Q28.3 OTHER MALFORMATIONS OF CEREBRAL VESSELS: ICD-10-CM

## 2025-04-21 PROCEDURE — 99205 OFFICE O/P NEW HI 60 MIN: CPT

## 2025-05-15 ENCOUNTER — APPOINTMENT (OUTPATIENT)
Dept: GASTROENTEROLOGY | Facility: CLINIC | Age: 55
End: 2025-05-15
Payer: COMMERCIAL

## 2025-05-15 VITALS
BODY MASS INDEX: 24.91 KG/M2 | HEART RATE: 68 BPM | HEIGHT: 66 IN | OXYGEN SATURATION: 97 % | SYSTOLIC BLOOD PRESSURE: 98 MMHG | WEIGHT: 155 LBS | DIASTOLIC BLOOD PRESSURE: 60 MMHG

## 2025-05-15 DIAGNOSIS — R74.8 ABNORMAL LEVELS OF OTHER SERUM ENZYMES: ICD-10-CM

## 2025-05-15 PROCEDURE — 99214 OFFICE O/P EST MOD 30 MIN: CPT

## 2025-05-15 RX ORDER — CHOLECALCIFEROL (VITAMIN D3) 125 MCG
50 MCG TABLET ORAL
Refills: 0 | Status: ACTIVE | COMMUNITY

## 2025-05-15 RX ORDER — FAMOTIDINE 20 MG/1
20 TABLET, FILM COATED ORAL
Refills: 0 | Status: ACTIVE | COMMUNITY

## 2025-05-16 LAB
ALBUMIN SERPL ELPH-MCNC: 4.4 G/DL
ALP BLD-CCNC: 112 U/L
ALT SERPL-CCNC: 42 U/L
ANION GAP SERPL CALC-SCNC: 16 MMOL/L
AST SERPL-CCNC: 37 U/L
BILIRUB DIRECT SERPL-MCNC: 0.1 MG/DL
BILIRUB SERPL-MCNC: 0.3 MG/DL
BUN SERPL-MCNC: 17 MG/DL
CALCIUM SERPL-MCNC: 9.4 MG/DL
CHLORIDE SERPL-SCNC: 101 MMOL/L
CO2 SERPL-SCNC: 24 MMOL/L
CREAT SERPL-MCNC: 1.02 MG/DL
EGFRCR SERPLBLD CKD-EPI 2021: 88 ML/MIN/1.73M2
GLUCOSE SERPL-MCNC: 63 MG/DL
POTASSIUM SERPL-SCNC: 4 MMOL/L
PROT SERPL-MCNC: 7.1 G/DL
SODIUM SERPL-SCNC: 141 MMOL/L

## 2025-08-19 ENCOUNTER — TRANSCRIPTION ENCOUNTER (OUTPATIENT)
Age: 55
End: 2025-08-19